# Patient Record
Sex: FEMALE | Race: WHITE | NOT HISPANIC OR LATINO | URBAN - METROPOLITAN AREA
[De-identification: names, ages, dates, MRNs, and addresses within clinical notes are randomized per-mention and may not be internally consistent; named-entity substitution may affect disease eponyms.]

---

## 2022-04-14 ENCOUNTER — OFFICE (OUTPATIENT)
Dept: URBAN - METROPOLITAN AREA CLINIC 10 | Facility: CLINIC | Age: 32
End: 2022-04-14

## 2022-04-14 VITALS
HEIGHT: 66 IN | SYSTOLIC BLOOD PRESSURE: 107 MMHG | WEIGHT: 157 LBS | OXYGEN SATURATION: 99 % | DIASTOLIC BLOOD PRESSURE: 61 MMHG | HEART RATE: 88 BPM

## 2022-04-14 DIAGNOSIS — R15.9 FULL INCONTINENCE OF FECES: ICD-10-CM

## 2022-04-14 DIAGNOSIS — R19.7 DIARRHEA, UNSPECIFIED: ICD-10-CM

## 2022-04-14 DIAGNOSIS — R53.83 OTHER FATIGUE: ICD-10-CM

## 2022-04-14 LAB
ANEMIA PROFILE A: BASO (ABSOLUTE): 0.1 X10E3/UL (ref 0–0.2)
ANEMIA PROFILE A: BASOS: 1 %
ANEMIA PROFILE A: EOS (ABSOLUTE): 0.1 X10E3/UL (ref 0–0.4)
ANEMIA PROFILE A: EOS: 1 %
ANEMIA PROFILE A: HEMATOCRIT: 32.2 % — LOW (ref 34–46.6)
ANEMIA PROFILE A: HEMOGLOBIN: 9.7 G/DL — LOW (ref 11.1–15.9)
ANEMIA PROFILE A: IMMATURE GRANS (ABS): 0 X10E3/UL (ref 0–0.1)
ANEMIA PROFILE A: IMMATURE GRANULOCYTES: 0 %
ANEMIA PROFILE A: IRON BIND.CAP.(TIBC): 436 UG/DL (ref 250–450)
ANEMIA PROFILE A: IRON SATURATION: 4 % — CRITICAL LOW (ref 15–55)
ANEMIA PROFILE A: IRON: 18 UG/DL — LOW (ref 27–159)
ANEMIA PROFILE A: LYMPHS (ABSOLUTE): 2 X10E3/UL (ref 0.7–3.1)
ANEMIA PROFILE A: LYMPHS: 25 %
ANEMIA PROFILE A: MCH: 24.4 PG — LOW (ref 26.6–33)
ANEMIA PROFILE A: MCHC: 30.1 G/DL — LOW (ref 31.5–35.7)
ANEMIA PROFILE A: MCV: 81 FL (ref 79–97)
ANEMIA PROFILE A: MONOCYTES(ABSOLUTE): 0.7 X10E3/UL (ref 0.1–0.9)
ANEMIA PROFILE A: MONOCYTES: 9 %
ANEMIA PROFILE A: NEUTROPHILS (ABSOLUTE): 5.2 X10E3/UL (ref 1.4–7)
ANEMIA PROFILE A: NEUTROPHILS: 64 %
ANEMIA PROFILE A: PLATELETS: 304 X10E3/UL (ref 150–450)
ANEMIA PROFILE A: RBC: 3.98 X10E6/UL (ref 3.77–5.28)
ANEMIA PROFILE A: RDW: 15.7 % — HIGH (ref 11.7–15.4)
ANEMIA PROFILE A: RETICULOCYTE COUNT: 1.2 % (ref 0.6–2.6)
ANEMIA PROFILE A: UIBC: 418 UG/DL (ref 131–425)
ANEMIA PROFILE A: WBC: 8.2 X10E3/UL (ref 3.4–10.8)
C-REACTIVE PROTEIN, QUANT: <1 MG/L
COMP. METABOLIC PANEL (14): A/G RATIO: 1.6 (ref 1.2–2.2)
COMP. METABOLIC PANEL (14): ALBUMIN: 4.5 G/DL (ref 3.9–5)
COMP. METABOLIC PANEL (14): ALKALINE PHOSPHATASE: 63 IU/L (ref 44–121)
COMP. METABOLIC PANEL (14): ALT (SGPT): 11 IU/L (ref 0–32)
COMP. METABOLIC PANEL (14): AST (SGOT): 17 IU/L (ref 0–40)
COMP. METABOLIC PANEL (14): BILIRUBIN, TOTAL: <0.2 MG/DL
COMP. METABOLIC PANEL (14): BUN/CREATININE RATIO: 20 (ref 9–23)
COMP. METABOLIC PANEL (14): BUN: 10 MG/DL (ref 6–20)
COMP. METABOLIC PANEL (14): CALCIUM: 9 MG/DL (ref 8.7–10.2)
COMP. METABOLIC PANEL (14): CARBON DIOXIDE, TOTAL: 22 MMOL/L (ref 20–29)
COMP. METABOLIC PANEL (14): CHLORIDE: 103 MMOL/L (ref 96–106)
COMP. METABOLIC PANEL (14): CREATININE: 0.51 MG/DL — LOW (ref 0.57–1)
COMP. METABOLIC PANEL (14): EGFR: 130 ML/MIN/1.73 (ref 59–?)
COMP. METABOLIC PANEL (14): GLOBULIN, TOTAL: 2.9 G/DL (ref 1.5–4.5)
COMP. METABOLIC PANEL (14): GLUCOSE: 88 MG/DL (ref 65–99)
COMP. METABOLIC PANEL (14): POTASSIUM: 4.9 MMOL/L (ref 3.5–5.2)
COMP. METABOLIC PANEL (14): PROTEIN, TOTAL: 7.4 G/DL (ref 6–8.5)
COMP. METABOLIC PANEL (14): SODIUM: 138 MMOL/L (ref 134–144)
IMMUNOGLOBULIN A, QN, SERUM: 302 MG/DL (ref 87–352)
T-TRANSGLUTAMINASE (TTG) IGA: <2 U/ML

## 2022-04-14 PROCEDURE — 99204 OFFICE O/P NEW MOD 45 MIN: CPT | Performed by: REGISTERED NURSE

## 2022-04-14 NOTE — INTERFACERESULTNOTES
I called and discussed labs with the patient. I informed her that her Iron levels are low. I will give her Ferrous sulfate 325mg to take bid. I did inform her that CRP was normal and CMP was normal. She verbalized understanding.

## 2022-04-14 NOTE — INTERFACERESULTNOTES
I called and discussed stool study results with the patient.  I informed her that her stool was positive for E coli.  Since she is having frequent episodes of diarrhea and incontinence,  I will give her Cipro 500 mg to take twice a day for 5 days.  I did instruct the patient about this.  She verbalized understanding.  I did instruct her to notify me if symptoms do not resolve.

## 2022-04-18 LAB
CALPROTECTIN, FECAL: 249 UG/G — HIGH (ref 0–120)
GI PROFILE, STOOL, PCR: ADENOVIRUS F 40/41: NOT DETECTED
GI PROFILE, STOOL, PCR: ASTROVIRUS: NOT DETECTED
GI PROFILE, STOOL, PCR: C DIFFICILE TOXIN A/B: NOT DETECTED
GI PROFILE, STOOL, PCR: CAMPYLOBACTER: NOT DETECTED
GI PROFILE, STOOL, PCR: CRYPTOSPORIDIUM: NOT DETECTED
GI PROFILE, STOOL, PCR: CYCLOSPORA CAYETANENSIS: NOT DETECTED
GI PROFILE, STOOL, PCR: E COLI O157: (no result)
GI PROFILE, STOOL, PCR: ENTAMOEBA HISTOLYTICA: NOT DETECTED
GI PROFILE, STOOL, PCR: ENTEROAGGREGATIVE E COLI: NOT DETECTED
GI PROFILE, STOOL, PCR: ENTEROPATHOGENIC E COLI: DETECTED
GI PROFILE, STOOL, PCR: ENTEROTOXIGENIC E COLI: NOT DETECTED
GI PROFILE, STOOL, PCR: GIARDIA LAMBLIA: NOT DETECTED
GI PROFILE, STOOL, PCR: NOROVIRUS GI/GII: NOT DETECTED
GI PROFILE, STOOL, PCR: PLESIOMONAS SHIGELLOIDES: NOT DETECTED
GI PROFILE, STOOL, PCR: ROTAVIRUS A: NOT DETECTED
GI PROFILE, STOOL, PCR: SALMONELLA: NOT DETECTED
GI PROFILE, STOOL, PCR: SAPOVIRUS: NOT DETECTED
GI PROFILE, STOOL, PCR: SHIGA-TOXIN-PRODUCING E COLI: NOT DETECTED
GI PROFILE, STOOL, PCR: SHIGELLA/ENTEROINVASIVE E COLI: NOT DETECTED
GI PROFILE, STOOL, PCR: VIBRIO CHOLERAE: NOT DETECTED
GI PROFILE, STOOL, PCR: VIBRIO: NOT DETECTED
GI PROFILE, STOOL, PCR: YERSINIA ENTEROCOLITICA: NOT DETECTED
GIARDIA LAMBLIA AG, EIA: NEGATIVE
PANCREATIC ELASTASE, FECAL: 149 UG ELAST./G — LOW (ref 200–?)